# Patient Record
Sex: MALE | Race: WHITE | Employment: FULL TIME | URBAN - METROPOLITAN AREA
[De-identification: names, ages, dates, MRNs, and addresses within clinical notes are randomized per-mention and may not be internally consistent; named-entity substitution may affect disease eponyms.]

---

## 2018-01-01 ENCOUNTER — APPOINTMENT (OUTPATIENT)
Dept: GENERAL RADIOLOGY | Age: 28
DRG: 917 | End: 2018-01-01
Payer: COMMERCIAL

## 2018-01-01 ENCOUNTER — HOSPITAL ENCOUNTER (INPATIENT)
Age: 28
LOS: 4 days | Discharge: HOSPICE | DRG: 917 | End: 2018-10-12
Attending: EMERGENCY MEDICINE | Admitting: INTERNAL MEDICINE
Payer: COMMERCIAL

## 2018-01-01 ENCOUNTER — APPOINTMENT (OUTPATIENT)
Dept: CT IMAGING | Age: 28
DRG: 917 | End: 2018-01-01
Payer: COMMERCIAL

## 2018-01-01 ENCOUNTER — APPOINTMENT (OUTPATIENT)
Dept: MRI IMAGING | Age: 28
DRG: 917 | End: 2018-01-01
Payer: COMMERCIAL

## 2018-01-01 VITALS
SYSTOLIC BLOOD PRESSURE: 152 MMHG | OXYGEN SATURATION: 91 % | TEMPERATURE: 98.8 F | HEIGHT: 72 IN | HEART RATE: 71 BPM | WEIGHT: 190.04 LBS | BODY MASS INDEX: 25.74 KG/M2 | DIASTOLIC BLOOD PRESSURE: 78 MMHG | RESPIRATION RATE: 26 BRPM

## 2018-01-01 DIAGNOSIS — T40.1X1A ACCIDENTAL OVERDOSE OF HEROIN, INITIAL ENCOUNTER (HCC): Primary | ICD-10-CM

## 2018-01-01 DIAGNOSIS — R77.8 ELEVATED TROPONIN: ICD-10-CM

## 2018-01-01 DIAGNOSIS — G93.1 ANOXIC BRAIN INJURY (HCC): ICD-10-CM

## 2018-01-01 DIAGNOSIS — N17.9 AKI (ACUTE KIDNEY INJURY) (HCC): ICD-10-CM

## 2018-01-01 LAB
ACANTHOCYTES: 0
ACETAMINOPHEN LEVEL: <5 UG/ML (ref 10–30)
ALBUMIN SERPL-MCNC: 2.6 G/DL (ref 3.9–4.9)
ALBUMIN SERPL-MCNC: 2.7 G/DL (ref 3.9–4.9)
ALBUMIN SERPL-MCNC: 2.8 G/DL (ref 3.9–4.9)
ALBUMIN SERPL-MCNC: 2.9 G/DL (ref 3.9–4.9)
ALBUMIN SERPL-MCNC: 3 G/DL (ref 3.9–4.9)
ALBUMIN SERPL-MCNC: 3.2 G/DL (ref 3.9–4.9)
ALP BLD-CCNC: 121 U/L (ref 35–104)
ALP BLD-CCNC: 61 U/L (ref 35–104)
ALP BLD-CCNC: 62 U/L (ref 35–104)
ALP BLD-CCNC: 65 U/L (ref 35–104)
ALP BLD-CCNC: 68 U/L (ref 35–104)
ALP BLD-CCNC: 93 U/L (ref 35–104)
ALT SERPL-CCNC: 1389 U/L (ref 0–41)
ALT SERPL-CCNC: 1475 U/L (ref 0–41)
ALT SERPL-CCNC: 1775 U/L (ref 0–41)
ALT SERPL-CCNC: 1919 U/L (ref 0–41)
ALT SERPL-CCNC: 614 U/L (ref 0–41)
ALT SERPL-CCNC: 963 U/L (ref 0–41)
AMPHETAMINE SCREEN, URINE: ABNORMAL
ANION GAP SERPL CALCULATED.3IONS-SCNC: 12 MEQ/L (ref 7–13)
ANION GAP SERPL CALCULATED.3IONS-SCNC: 13 MEQ/L (ref 7–13)
ANION GAP SERPL CALCULATED.3IONS-SCNC: 14 MEQ/L (ref 7–13)
ANION GAP SERPL CALCULATED.3IONS-SCNC: 14 MEQ/L (ref 7–13)
ANION GAP SERPL CALCULATED.3IONS-SCNC: 15 MEQ/L (ref 7–13)
ANISOCYTOSIS: 0
APTT: 31.9 SEC (ref 21.6–35.4)
AST SERPL-CCNC: 103 U/L (ref 0–40)
AST SERPL-CCNC: 1078 U/L (ref 0–40)
AST SERPL-CCNC: 1363 U/L (ref 0–40)
AST SERPL-CCNC: 157 U/L (ref 0–40)
AST SERPL-CCNC: 347 U/L (ref 0–40)
AST SERPL-CCNC: 788 U/L (ref 0–40)
AUER RODS: 0
BANDED NEUTROPHILS RELATIVE PERCENT: 3 %
BARBITURATE SCREEN URINE: ABNORMAL
BASE EXCESS ARTERIAL: -5 (ref -3–3)
BASOPHILIC STIPPLING: 0
BASOPHILS ABSOLUTE: 0 K/UL (ref 0–0.2)
BASOPHILS RELATIVE PERCENT: 0.3 %
BENZODIAZEPINE SCREEN, URINE: ABNORMAL
BILIRUB SERPL-MCNC: 0.6 MG/DL (ref 0–1.2)
BILIRUB SERPL-MCNC: 0.8 MG/DL (ref 0–1.2)
BILIRUB SERPL-MCNC: 0.9 MG/DL (ref 0–1.2)
BILIRUB SERPL-MCNC: 1 MG/DL (ref 0–1.2)
BILIRUB SERPL-MCNC: 1.3 MG/DL (ref 0–1.2)
BILIRUB SERPL-MCNC: 1.3 MG/DL (ref 0–1.2)
BILIRUBIN DIRECT: 0.3 MG/DL (ref 0–0.3)
BILIRUBIN DIRECT: 0.3 MG/DL (ref 0–0.3)
BILIRUBIN DIRECT: 0.4 MG/DL (ref 0–0.3)
BILIRUBIN DIRECT: 0.5 MG/DL (ref 0–0.3)
BILIRUBIN DIRECT: 0.6 MG/DL (ref 0–0.3)
BILIRUBIN, INDIRECT: 0.5 MG/DL (ref 0–0.6)
BILIRUBIN, INDIRECT: 0.5 MG/DL (ref 0–0.6)
BILIRUBIN, INDIRECT: 0.7 MG/DL (ref 0–0.6)
BILIRUBIN, INDIRECT: 0.7 MG/DL (ref 0–0.6)
BILIRUBIN, INDIRECT: 0.8 MG/DL (ref 0–0.6)
BLOOD CULTURE, ROUTINE: NORMAL
BUN BLDV-MCNC: 11 MG/DL (ref 6–20)
BUN BLDV-MCNC: 13 MG/DL (ref 6–20)
BUN BLDV-MCNC: 15 MG/DL (ref 6–20)
BUN BLDV-MCNC: 15 MG/DL (ref 6–20)
BUN BLDV-MCNC: 24 MG/DL (ref 6–20)
BURR CELLS: 0
CABOT RINGS: 0
CALCIUM IONIZED, CALC AT PH 7.4: 1.16 MMOL/L (ref 1.11–1.3)
CALCIUM IONIZED, CALC AT PH 7.4: 1.25 MMOL/L (ref 1.11–1.3)
CALCIUM IONIZED, CALC AT PH 7.4: 1.3 MMOL/L (ref 1.11–1.3)
CALCIUM IONIZED: 1.03 MMOL/L (ref 1.12–1.32)
CALCIUM IONIZED: 1.17 MMOL/L (ref 1.11–1.3)
CALCIUM IONIZED: 1.21 MMOL/L (ref 1.11–1.3)
CALCIUM IONIZED: 1.25 MMOL/L (ref 1.11–1.3)
CALCIUM SERPL-MCNC: 7.2 MG/DL (ref 8.6–10.2)
CALCIUM SERPL-MCNC: 7.9 MG/DL (ref 8.6–10.2)
CALCIUM SERPL-MCNC: 8.2 MG/DL (ref 8.6–10.2)
CALCIUM SERPL-MCNC: 8.3 MG/DL (ref 8.6–10.2)
CALCIUM SERPL-MCNC: 8.4 MG/DL (ref 8.6–10.2)
CANNABINOID SCREEN URINE: ABNORMAL
CHLORIDE BLD-SCNC: 101 MEQ/L (ref 98–107)
CHLORIDE BLD-SCNC: 106 MEQ/L (ref 98–107)
CHLORIDE BLD-SCNC: 109 MEQ/L (ref 98–107)
CHLORIDE BLD-SCNC: 109 MEQ/L (ref 98–107)
CHLORIDE BLD-SCNC: 113 MEQ/L (ref 98–107)
CK MB: 16.7 NG/ML (ref 0–6.7)
CK MB: 5.4 NG/ML (ref 0–6.7)
CK MB: 64.1 NG/ML (ref 0–6.7)
CO2: 20 MEQ/L (ref 22–29)
CO2: 21 MEQ/L (ref 22–29)
CO2: 22 MEQ/L (ref 22–29)
COCAINE METABOLITE SCREEN URINE: ABNORMAL
CREAT SERPL-MCNC: 0.31 MG/DL (ref 0.7–1.2)
CREAT SERPL-MCNC: 0.53 MG/DL (ref 0.7–1.2)
CREAT SERPL-MCNC: 0.57 MG/DL (ref 0.7–1.2)
CREAT SERPL-MCNC: 0.7 MG/DL (ref 0.7–1.2)
CREAT SERPL-MCNC: 1.79 MG/DL (ref 0.7–1.2)
CREATINE KINASE-MB INDEX: 0.2 % (ref 0–3.5)
CREATINE KINASE-MB INDEX: 0.2 % (ref 0–3.5)
CREATINE KINASE-MB INDEX: 0.3 % (ref 0–3.5)
CULTURE, BLOOD 2: NORMAL
CULTURE, RESPIRATORY: NORMAL
DOHLE BODIES: 0
EKG ATRIAL RATE: 102 BPM
EKG P AXIS: 66 DEGREES
EKG P-R INTERVAL: 140 MS
EKG Q-T INTERVAL: 362 MS
EKG QRS DURATION: 88 MS
EKG QTC CALCULATION (BAZETT): 471 MS
EKG R AXIS: 73 DEGREES
EKG T AXIS: 42 DEGREES
EKG VENTRICULAR RATE: 102 BPM
EOSINOPHILS ABSOLUTE: 0 K/UL (ref 0–0.7)
EOSINOPHILS RELATIVE PERCENT: 0.1 %
ETHANOL PERCENT: NORMAL G/DL
ETHANOL: <10 MG/DL (ref 0–0.08)
GFR AFRICAN AMERICAN: 54.2
GFR AFRICAN AMERICAN: 58
GFR AFRICAN AMERICAN: >60
GFR NON-AFRICAN AMERICAN: 44.8
GFR NON-AFRICAN AMERICAN: 48
GFR NON-AFRICAN AMERICAN: >60
GLOBULIN: 1.9 G/DL (ref 2.3–3.5)
GLUCOSE BLD-MCNC: 106 MG/DL (ref 60–115)
GLUCOSE BLD-MCNC: 106 MG/DL (ref 60–115)
GLUCOSE BLD-MCNC: 113 MG/DL (ref 60–115)
GLUCOSE BLD-MCNC: 114 MG/DL (ref 74–109)
GLUCOSE BLD-MCNC: 116 MG/DL (ref 60–115)
GLUCOSE BLD-MCNC: 120 MG/DL (ref 60–115)
GLUCOSE BLD-MCNC: 121 MG/DL (ref 60–115)
GLUCOSE BLD-MCNC: 122 MG/DL (ref 60–115)
GLUCOSE BLD-MCNC: 123 MG/DL (ref 60–115)
GLUCOSE BLD-MCNC: 130 MG/DL (ref 74–109)
GLUCOSE BLD-MCNC: 130 MG/DL (ref 74–109)
GLUCOSE BLD-MCNC: 136 MG/DL (ref 60–115)
GLUCOSE BLD-MCNC: 140 MG/DL (ref 60–115)
GLUCOSE BLD-MCNC: 142 MG/DL (ref 74–109)
GLUCOSE BLD-MCNC: 144 MG/DL (ref 60–115)
GLUCOSE BLD-MCNC: 154 MG/DL (ref 74–109)
GLUCOSE BLD-MCNC: 156 MG/DL (ref 60–115)
GRAM STAIN RESULT: NORMAL
HAIRY CELLS: 0
HAV IGM SER IA-ACNC: NORMAL
HCO3 ARTERIAL: 19.7 MMOL/L (ref 21–29)
HCT VFR BLD CALC: 42.8 % (ref 42–52)
HCT VFR BLD CALC: 45 % (ref 42–52)
HCT VFR BLD CALC: 45.5 % (ref 42–52)
HCT VFR BLD CALC: 46 % (ref 42–52)
HCT VFR BLD CALC: 47.5 % (ref 42–52)
HEMOGLOBIN: 14.4 GM/DL (ref 13.5–17.5)
HEMOGLOBIN: 14.9 G/DL (ref 14–18)
HEMOGLOBIN: 15.6 G/DL (ref 14–18)
HEMOGLOBIN: 15.7 G/DL (ref 14–18)
HEMOGLOBIN: 15.8 G/DL (ref 14–18)
HEMOGLOBIN: 16.1 G/DL (ref 14–18)
HEPATITIS B CORE IGM ANTIBODY: NORMAL
HEPATITIS B SURFACE ANTIGEN INTERPRETATION: NORMAL
HEPATITIS C ANTIBODY INTERPRETATION: NORMAL
HOWELL-JOLLY BODIES: 0
HYPERSEGMENTED NEUTROPHILS: 0
HYPOCHROMIA: 0
INR BLD: 1.2
INR BLD: 1.3
LACTATE DEHYDROGENASE: 2337 U/L (ref 135–225)
LACTATE: 1.38 MMOL/L (ref 0.4–2)
LACTIC ACID: 1.7 MMOL/L (ref 0.5–2.2)
LYMPHOCYTES ABSOLUTE: 0.4 K/UL (ref 1–4.8)
LYMPHOCYTES RELATIVE PERCENT: 4 %
Lab: ABNORMAL
MACROCYTES: 0
MAGNESIUM: 2.1 MG/DL (ref 1.7–2.3)
MAGNESIUM: 2.2 MG/DL (ref 1.7–2.3)
MCH RBC QN AUTO: 31.4 PG (ref 27–31.3)
MCH RBC QN AUTO: 31.5 PG (ref 27–31.3)
MCH RBC QN AUTO: 31.8 PG (ref 27–31.3)
MCH RBC QN AUTO: 31.8 PG (ref 27–31.3)
MCH RBC QN AUTO: 31.9 PG (ref 27–31.3)
MCHC RBC AUTO-ENTMCNC: 33.9 % (ref 33–37)
MCHC RBC AUTO-ENTMCNC: 34.5 % (ref 33–37)
MCHC RBC AUTO-ENTMCNC: 34.6 % (ref 33–37)
MCHC RBC AUTO-ENTMCNC: 34.6 % (ref 33–37)
MCHC RBC AUTO-ENTMCNC: 34.9 % (ref 33–37)
MCV RBC AUTO: 90.8 FL (ref 80–100)
MCV RBC AUTO: 91.2 FL (ref 80–100)
MCV RBC AUTO: 91.9 FL (ref 80–100)
MCV RBC AUTO: 92.6 FL (ref 80–100)
MCV RBC AUTO: 92.8 FL (ref 80–100)
METAMYELOCYTES RELATIVE PERCENT: 2 %
MICROCYTES: 0
MISCELLANEOUS LAB TEST ORDER: NORMAL
MONOCYTES ABSOLUTE: 0.9 K/UL (ref 0.2–0.8)
MONOCYTES RELATIVE PERCENT: 7.6 %
NEUTROPHILS ABSOLUTE: 9.8 K/UL (ref 1.4–6.5)
NEUTROPHILS RELATIVE PERCENT: 84 %
NUCLEATED RED BLOOD CELLS: 1 /100 WBC
O2 SAT, ARTERIAL: 100 % (ref 93–100)
OPIATE SCREEN URINE: POSITIVE
OVALOCYTES: 0
PAPPENHEIMER BODIES: 0
PCO2 ARTERIAL: 33 MM HG (ref 35–45)
PDW BLD-RTO: 12.4 % (ref 11.5–14.5)
PDW BLD-RTO: 12.4 % (ref 11.5–14.5)
PDW BLD-RTO: 12.8 % (ref 11.5–14.5)
PDW BLD-RTO: 13 % (ref 11.5–14.5)
PDW BLD-RTO: 13.1 % (ref 11.5–14.5)
PELGER HUET CELLS: 0 %
PERFORMED ON: ABNORMAL
PERFORMED ON: NORMAL
PH ARTERIAL: 7.38 (ref 7.35–7.45)
PHENCYCLIDINE SCREEN URINE: ABNORMAL
PHOSPHORUS: 0.8 MG/DL (ref 2.5–4.5)
PHOSPHORUS: 1.2 MG/DL (ref 2.5–4.5)
PHOSPHORUS: 1.9 MG/DL (ref 2.5–4.5)
PHOSPHORUS: 3.2 MG/DL (ref 2.5–4.5)
PLATELET # BLD: 245 K/UL (ref 130–400)
PLATELET # BLD: 249 K/UL (ref 130–400)
PLATELET # BLD: 252 K/UL (ref 130–400)
PLATELET # BLD: 285 K/UL (ref 130–400)
PLATELET # BLD: 295 K/UL (ref 130–400)
PLATELET SLIDE REVIEW: ADEQUATE
PO2 ARTERIAL: 176 MM HG (ref 75–108)
POC CHLORIDE: 107 MEQ/L (ref 99–110)
POC CREATININE: 1.7 MG/DL (ref 0.9–1.3)
POC FIO2: 100
POC HEMATOCRIT: 42 % (ref 41–53)
POC POTASSIUM: 4.6 MEQ/L (ref 3.5–5.1)
POC SAMPLE TYPE: ABNORMAL
POC SODIUM: 137 MEQ/L (ref 136–145)
POIKILOCYTES: 0
POLYCHROMASIA: 0
POTASSIUM REFLEX MAGNESIUM: 3.5 MEQ/L (ref 3.5–5.1)
POTASSIUM REFLEX MAGNESIUM: 3.6 MEQ/L (ref 3.5–5.1)
POTASSIUM REFLEX MAGNESIUM: 4 MEQ/L (ref 3.5–5.1)
POTASSIUM REFLEX MAGNESIUM: 4.2 MEQ/L (ref 3.5–5.1)
POTASSIUM SERPL-SCNC: 3.5 MEQ/L (ref 3.5–5.1)
POTASSIUM SERPL-SCNC: 3.6 MEQ/L (ref 3.5–5.1)
POTASSIUM SERPL-SCNC: 4 MEQ/L (ref 3.5–5.1)
POTASSIUM SERPL-SCNC: 4.2 MEQ/L (ref 3.5–5.1)
POTASSIUM SERPL-SCNC: 4.6 MEQ/L (ref 3.5–5.1)
PROCALCITONIN: 0.74 NG/ML (ref 0–0.15)
PROTHROMBIN TIME: 13 SEC (ref 9.6–12.3)
PROTHROMBIN TIME: 13.4 SEC (ref 9.6–12.3)
RBC # BLD: 4.69 M/UL (ref 4.7–6.1)
RBC # BLD: 4.95 M/UL (ref 4.7–6.1)
RBC # BLD: 4.96 M/UL (ref 4.7–6.1)
RBC # BLD: 4.97 M/UL (ref 4.7–6.1)
RBC # BLD: 5.12 M/UL (ref 4.7–6.1)
RBC # BLD: NORMAL 10*6/UL
SCHISTOCYTES: 0
SICKLE CELLS: 0
SMUDGE CELLS: 1
SODIUM BLD-SCNC: 136 MEQ/L (ref 132–144)
SODIUM BLD-SCNC: 142 MEQ/L (ref 132–144)
SODIUM BLD-SCNC: 143 MEQ/L (ref 132–144)
SODIUM BLD-SCNC: 144 MEQ/L (ref 132–144)
SODIUM BLD-SCNC: 148 MEQ/L (ref 132–144)
SPHEROCYTES: 0
STOMATOCYTES: 0
TARGET CELLS: 0
TCO2 ARTERIAL: 21 (ref 22–29)
TEAR DROP CELLS: 0
TOTAL CK: 2406 U/L (ref 0–190)
TOTAL CK: 7058 U/L (ref 0–190)
TOTAL CK: ABNORMAL U/L (ref 0–190)
TOTAL PROTEIN: 5.1 G/DL (ref 6.4–8.1)
TOTAL PROTEIN: 5.2 G/DL (ref 6.4–8.1)
TOTAL PROTEIN: 5.2 G/DL (ref 6.4–8.1)
TOTAL PROTEIN: 5.4 G/DL (ref 6.4–8.1)
TOTAL PROTEIN: 5.5 G/DL (ref 6.4–8.1)
TOTAL PROTEIN: 5.5 G/DL (ref 6.4–8.1)
TOXIC GRANULATION: 0
TROPONIN: 0.05 NG/ML (ref 0–0.01)
TROPONIN: 0.05 NG/ML (ref 0–0.01)
TROPONIN: 0.06 NG/ML (ref 0–0.01)
VACUOLATED NEUTROPHILS: 0
WBC # BLD: 11 K/UL (ref 4.8–10.8)
WBC # BLD: 13.4 K/UL (ref 4.8–10.8)
WBC # BLD: 15.1 K/UL (ref 4.8–10.8)
WBC # BLD: 15.3 K/UL (ref 4.8–10.8)
WBC # BLD: 16.4 K/UL (ref 4.8–10.8)
WHOPPER PROMPT: NORMAL

## 2018-01-01 PROCEDURE — 87070 CULTURE OTHR SPECIMN AEROBIC: CPT

## 2018-01-01 PROCEDURE — 94003 VENT MGMT INPAT SUBQ DAY: CPT

## 2018-01-01 PROCEDURE — 6360000002 HC RX W HCPCS: Performed by: NURSE PRACTITIONER

## 2018-01-01 PROCEDURE — 6370000000 HC RX 637 (ALT 250 FOR IP): Performed by: INTERNAL MEDICINE

## 2018-01-01 PROCEDURE — 2500000003 HC RX 250 WO HCPCS: Performed by: EMERGENCY MEDICINE

## 2018-01-01 PROCEDURE — 94002 VENT MGMT INPAT INIT DAY: CPT

## 2018-01-01 PROCEDURE — 71045 X-RAY EXAM CHEST 1 VIEW: CPT

## 2018-01-01 PROCEDURE — 82330 ASSAY OF CALCIUM: CPT

## 2018-01-01 PROCEDURE — 70553 MRI BRAIN STEM W/O & W/DYE: CPT

## 2018-01-01 PROCEDURE — 80076 HEPATIC FUNCTION PANEL: CPT

## 2018-01-01 PROCEDURE — 80048 BASIC METABOLIC PNL TOTAL CA: CPT

## 2018-01-01 PROCEDURE — 2700000000 HC OXYGEN THERAPY PER DAY

## 2018-01-01 PROCEDURE — 85025 COMPLETE CBC W/AUTO DIFF WBC: CPT

## 2018-01-01 PROCEDURE — 36415 COLL VENOUS BLD VENIPUNCTURE: CPT

## 2018-01-01 PROCEDURE — 2580000003 HC RX 258: Performed by: INTERNAL MEDICINE

## 2018-01-01 PROCEDURE — 70450 CT HEAD/BRAIN W/O DYE: CPT

## 2018-01-01 PROCEDURE — 87040 BLOOD CULTURE FOR BACTERIA: CPT

## 2018-01-01 PROCEDURE — 2580000003 HC RX 258: Performed by: PSYCHIATRY & NEUROLOGY

## 2018-01-01 PROCEDURE — 83605 ASSAY OF LACTIC ACID: CPT

## 2018-01-01 PROCEDURE — 83735 ASSAY OF MAGNESIUM: CPT

## 2018-01-01 PROCEDURE — 99291 CRITICAL CARE FIRST HOUR: CPT | Performed by: INTERNAL MEDICINE

## 2018-01-01 PROCEDURE — 85027 COMPLETE CBC AUTOMATED: CPT

## 2018-01-01 PROCEDURE — 80053 COMPREHEN METABOLIC PANEL: CPT

## 2018-01-01 PROCEDURE — 6360000002 HC RX W HCPCS: Performed by: INTERNAL MEDICINE

## 2018-01-01 PROCEDURE — 94640 AIRWAY INHALATION TREATMENT: CPT

## 2018-01-01 PROCEDURE — 82565 ASSAY OF CREATININE: CPT

## 2018-01-01 PROCEDURE — 2580000003 HC RX 258: Performed by: NURSE PRACTITIONER

## 2018-01-01 PROCEDURE — 82550 ASSAY OF CK (CPK): CPT

## 2018-01-01 PROCEDURE — 2580000003 HC RX 258

## 2018-01-01 PROCEDURE — 95816 EEG AWAKE AND DROWSY: CPT

## 2018-01-01 PROCEDURE — 84132 ASSAY OF SERUM POTASSIUM: CPT

## 2018-01-01 PROCEDURE — 5A1955Z RESPIRATORY VENTILATION, GREATER THAN 96 CONSECUTIVE HOURS: ICD-10-PCS | Performed by: EMERGENCY MEDICINE

## 2018-01-01 PROCEDURE — 2000000000 HC ICU R&B

## 2018-01-01 PROCEDURE — 82553 CREATINE MB FRACTION: CPT

## 2018-01-01 PROCEDURE — 99285 EMERGENCY DEPT VISIT HI MDM: CPT

## 2018-01-01 PROCEDURE — 84100 ASSAY OF PHOSPHORUS: CPT

## 2018-01-01 PROCEDURE — 6360000004 HC RX CONTRAST MEDICATION: Performed by: PSYCHIATRY & NEUROLOGY

## 2018-01-01 PROCEDURE — A9579 GAD-BASE MR CONTRAST NOS,1ML: HCPCS | Performed by: PSYCHIATRY & NEUROLOGY

## 2018-01-01 PROCEDURE — 85610 PROTHROMBIN TIME: CPT

## 2018-01-01 PROCEDURE — 96375 TX/PRO/DX INJ NEW DRUG ADDON: CPT

## 2018-01-01 PROCEDURE — 2580000003 HC RX 258: Performed by: EMERGENCY MEDICINE

## 2018-01-01 PROCEDURE — 93010 ELECTROCARDIOGRAM REPORT: CPT | Performed by: INTERNAL MEDICINE

## 2018-01-01 PROCEDURE — 80074 ACUTE HEPATITIS PANEL: CPT

## 2018-01-01 PROCEDURE — 82948 REAGENT STRIP/BLOOD GLUCOSE: CPT

## 2018-01-01 PROCEDURE — 2500000003 HC RX 250 WO HCPCS

## 2018-01-01 PROCEDURE — 93005 ELECTROCARDIOGRAM TRACING: CPT

## 2018-01-01 PROCEDURE — 80307 DRUG TEST PRSMV CHEM ANLYZR: CPT

## 2018-01-01 PROCEDURE — 31500 INSERT EMERGENCY AIRWAY: CPT

## 2018-01-01 PROCEDURE — 6360000002 HC RX W HCPCS: Performed by: EMERGENCY MEDICINE

## 2018-01-01 PROCEDURE — 85730 THROMBOPLASTIN TIME PARTIAL: CPT

## 2018-01-01 PROCEDURE — 84295 ASSAY OF SERUM SODIUM: CPT

## 2018-01-01 PROCEDURE — 85014 HEMATOCRIT: CPT

## 2018-01-01 PROCEDURE — 84484 ASSAY OF TROPONIN QUANT: CPT

## 2018-01-01 PROCEDURE — 2500000003 HC RX 250 WO HCPCS: Performed by: INTERNAL MEDICINE

## 2018-01-01 PROCEDURE — 0BH17EZ INSERTION OF ENDOTRACHEAL AIRWAY INTO TRACHEA, VIA NATURAL OR ARTIFICIAL OPENING: ICD-10-PCS | Performed by: EMERGENCY MEDICINE

## 2018-01-01 PROCEDURE — 96374 THER/PROPH/DIAG INJ IV PUSH: CPT

## 2018-01-01 PROCEDURE — 87205 SMEAR GRAM STAIN: CPT

## 2018-01-01 PROCEDURE — 82435 ASSAY OF BLOOD CHLORIDE: CPT

## 2018-01-01 PROCEDURE — G0480 DRUG TEST DEF 1-7 CLASSES: HCPCS

## 2018-01-01 PROCEDURE — 6360000002 HC RX W HCPCS

## 2018-01-01 PROCEDURE — 89220 SPUTUM SPECIMEN COLLECTION: CPT

## 2018-01-01 PROCEDURE — 84145 PROCALCITONIN (PCT): CPT

## 2018-01-01 PROCEDURE — 82803 BLOOD GASES ANY COMBINATION: CPT

## 2018-01-01 PROCEDURE — 83615 LACTATE (LD) (LDH) ENZYME: CPT

## 2018-01-01 RX ORDER — MORPHINE SULFATE 4 MG/ML
4 INJECTION, SOLUTION INTRAMUSCULAR; INTRAVENOUS ONCE
Status: COMPLETED | OUTPATIENT
Start: 2018-01-01 | End: 2018-01-01

## 2018-01-01 RX ORDER — MAGNESIUM SULFATE 1 G/100ML
1 INJECTION INTRAVENOUS PRN
Status: DISCONTINUED | OUTPATIENT
Start: 2018-01-01 | End: 2018-01-01

## 2018-01-01 RX ORDER — MORPHINE SULFATE 2 MG/ML
2 INJECTION, SOLUTION INTRAMUSCULAR; INTRAVENOUS EVERY 30 MIN PRN
Status: CANCELLED | OUTPATIENT
Start: 2018-01-01

## 2018-01-01 RX ORDER — LORAZEPAM 2 MG/ML
INJECTION INTRAMUSCULAR
Status: COMPLETED
Start: 2018-01-01 | End: 2018-01-01

## 2018-01-01 RX ORDER — MORPHINE SULFATE 4 MG/ML
INJECTION, SOLUTION INTRAMUSCULAR; INTRAVENOUS
Status: COMPLETED
Start: 2018-01-01 | End: 2018-01-01

## 2018-01-01 RX ORDER — PROPOFOL 10 MG/ML
10 INJECTION, EMULSION INTRAVENOUS ONCE
Status: COMPLETED | OUTPATIENT
Start: 2018-01-01 | End: 2018-01-01

## 2018-01-01 RX ORDER — FUROSEMIDE 20 MG/1
20 TABLET ORAL DAILY
Status: DISCONTINUED | OUTPATIENT
Start: 2018-01-01 | End: 2018-01-01

## 2018-01-01 RX ORDER — ALBUTEROL SULFATE 90 UG/1
4 AEROSOL, METERED RESPIRATORY (INHALATION) 4 TIMES DAILY
Status: DISCONTINUED | OUTPATIENT
Start: 2018-01-01 | End: 2018-01-01

## 2018-01-01 RX ORDER — 0.9 % SODIUM CHLORIDE 0.9 %
1000 INTRAVENOUS SOLUTION INTRAVENOUS ONCE
Status: COMPLETED | OUTPATIENT
Start: 2018-01-01 | End: 2018-01-01

## 2018-01-01 RX ORDER — ETOMIDATE 2 MG/ML
INJECTION INTRAVENOUS
Status: COMPLETED
Start: 2018-01-01 | End: 2018-01-01

## 2018-01-01 RX ORDER — SODIUM CHLORIDE 9 MG/ML
INJECTION, SOLUTION INTRAVENOUS
Status: COMPLETED
Start: 2018-01-01 | End: 2018-01-01

## 2018-01-01 RX ORDER — ACETAMINOPHEN 160 MG/5ML
650 SOLUTION ORAL EVERY 4 HOURS PRN
Status: DISCONTINUED | OUTPATIENT
Start: 2018-01-01 | End: 2018-01-01

## 2018-01-01 RX ORDER — LABETALOL HYDROCHLORIDE 5 MG/ML
10 INJECTION, SOLUTION INTRAVENOUS EVERY 6 HOURS PRN
Status: DISCONTINUED | OUTPATIENT
Start: 2018-01-01 | End: 2018-01-01

## 2018-01-01 RX ORDER — IPRATROPIUM BROMIDE AND ALBUTEROL SULFATE 2.5; .5 MG/3ML; MG/3ML
1 SOLUTION RESPIRATORY (INHALATION) 4 TIMES DAILY
Status: DISCONTINUED | OUTPATIENT
Start: 2018-01-01 | End: 2018-01-01

## 2018-01-01 RX ORDER — SODIUM CHLORIDE 9 MG/ML
INJECTION, SOLUTION INTRAVENOUS CONTINUOUS
Status: DISCONTINUED | OUTPATIENT
Start: 2018-01-01 | End: 2018-01-01 | Stop reason: ALTCHOICE

## 2018-01-01 RX ORDER — SODIUM CHLORIDE 0.9 % (FLUSH) 0.9 %
10 SYRINGE (ML) INJECTION 2 TIMES DAILY
Status: DISCONTINUED | OUTPATIENT
Start: 2018-01-01 | End: 2018-01-01

## 2018-01-01 RX ORDER — LORAZEPAM 2 MG/ML
1 INJECTION INTRAMUSCULAR
Status: CANCELLED | OUTPATIENT
Start: 2018-01-01

## 2018-01-01 RX ORDER — DEXTROSE MONOHYDRATE 50 MG/ML
100 INJECTION, SOLUTION INTRAVENOUS PRN
Status: DISCONTINUED | OUTPATIENT
Start: 2018-01-01 | End: 2018-01-01

## 2018-01-01 RX ORDER — ROCURONIUM BROMIDE 10 MG/ML
100 INJECTION, SOLUTION INTRAVENOUS ONCE
Status: COMPLETED | OUTPATIENT
Start: 2018-01-01 | End: 2018-01-01

## 2018-01-01 RX ORDER — ALBUTEROL SULFATE 2.5 MG/3ML
2.5 SOLUTION RESPIRATORY (INHALATION) EVERY 4 HOURS
Status: DISCONTINUED | OUTPATIENT
Start: 2018-01-01 | End: 2018-01-01

## 2018-01-01 RX ORDER — POTASSIUM CHLORIDE 7.45 MG/ML
10 INJECTION INTRAVENOUS PRN
Status: DISCONTINUED | OUTPATIENT
Start: 2018-01-01 | End: 2018-01-01

## 2018-01-01 RX ORDER — HYDRALAZINE HYDROCHLORIDE 20 MG/ML
10 INJECTION INTRAMUSCULAR; INTRAVENOUS EVERY 6 HOURS PRN
Status: DISCONTINUED | OUTPATIENT
Start: 2018-01-01 | End: 2018-01-01

## 2018-01-01 RX ORDER — SODIUM CHLORIDE 0.9 % (FLUSH) 0.9 %
10 SYRINGE (ML) INJECTION EVERY 12 HOURS SCHEDULED
Status: DISCONTINUED | OUTPATIENT
Start: 2018-01-01 | End: 2018-01-01

## 2018-01-01 RX ORDER — NALOXONE HYDROCHLORIDE 1 MG/ML
4 INJECTION INTRAMUSCULAR; INTRAVENOUS; SUBCUTANEOUS ONCE
Status: COMPLETED | OUTPATIENT
Start: 2018-01-01 | End: 2018-01-01

## 2018-01-01 RX ORDER — MIDAZOLAM HYDROCHLORIDE 1 MG/ML
4 INJECTION INTRAMUSCULAR; INTRAVENOUS ONCE
Status: COMPLETED | OUTPATIENT
Start: 2018-01-01 | End: 2018-01-01

## 2018-01-01 RX ORDER — SODIUM CHLORIDE 0.9 % (FLUSH) 0.9 %
10 SYRINGE (ML) INJECTION PRN
Status: DISCONTINUED | OUTPATIENT
Start: 2018-01-01 | End: 2018-01-01

## 2018-01-01 RX ORDER — ONDANSETRON 2 MG/ML
4 INJECTION INTRAMUSCULAR; INTRAVENOUS EVERY 6 HOURS PRN
Status: DISCONTINUED | OUTPATIENT
Start: 2018-01-01 | End: 2018-01-01

## 2018-01-01 RX ORDER — NICOTINE POLACRILEX 4 MG
15 LOZENGE BUCCAL PRN
Status: DISCONTINUED | OUTPATIENT
Start: 2018-01-01 | End: 2018-01-01

## 2018-01-01 RX ORDER — DEXTROSE MONOHYDRATE 50 MG/ML
INJECTION, SOLUTION INTRAVENOUS CONTINUOUS
Status: DISCONTINUED | OUTPATIENT
Start: 2018-01-01 | End: 2018-01-01

## 2018-01-01 RX ORDER — SODIUM CHLORIDE 450 MG/100ML
INJECTION, SOLUTION INTRAVENOUS CONTINUOUS
Status: DISCONTINUED | OUTPATIENT
Start: 2018-01-01 | End: 2018-01-01

## 2018-01-01 RX ORDER — DEXTROSE MONOHYDRATE 25 G/50ML
12.5 INJECTION, SOLUTION INTRAVENOUS PRN
Status: DISCONTINUED | OUTPATIENT
Start: 2018-01-01 | End: 2018-01-01

## 2018-01-01 RX ORDER — MORPHINE SULFATE 2 MG/ML
2 INJECTION, SOLUTION INTRAMUSCULAR; INTRAVENOUS EVERY 30 MIN PRN
Status: DISCONTINUED | OUTPATIENT
Start: 2018-01-01 | End: 2018-01-01 | Stop reason: HOSPADM

## 2018-01-01 RX ORDER — LORAZEPAM 2 MG/ML
1 INJECTION INTRAMUSCULAR
Status: DISCONTINUED | OUTPATIENT
Start: 2018-01-01 | End: 2018-01-01 | Stop reason: HOSPADM

## 2018-01-01 RX ORDER — FAMOTIDINE 20 MG/1
20 TABLET, FILM COATED ORAL 2 TIMES DAILY
Status: DISCONTINUED | OUTPATIENT
Start: 2018-01-01 | End: 2018-01-01

## 2018-01-01 RX ORDER — POLYETHYLENE GLYCOL 3350 17 G/17G
17 POWDER, FOR SOLUTION ORAL DAILY
Status: DISCONTINUED | OUTPATIENT
Start: 2018-01-01 | End: 2018-01-01

## 2018-01-01 RX ORDER — PROPOFOL 10 MG/ML
10 INJECTION, EMULSION INTRAVENOUS
Status: DISCONTINUED | OUTPATIENT
Start: 2018-01-01 | End: 2018-01-01

## 2018-01-01 RX ADMIN — Medication 10 ML: at 08:31

## 2018-01-01 RX ADMIN — LORAZEPAM 1 MG: 2 INJECTION INTRAMUSCULAR; INTRAVENOUS at 14:45

## 2018-01-01 RX ADMIN — SODIUM CHLORIDE: 9 INJECTION, SOLUTION INTRAVENOUS at 03:23

## 2018-01-01 RX ADMIN — LORAZEPAM 1 MG: 2 INJECTION INTRAMUSCULAR; INTRAVENOUS at 15:12

## 2018-01-01 RX ADMIN — FAMOTIDINE 20 MG: 20 TABLET ORAL at 21:05

## 2018-01-01 RX ADMIN — Medication 10 ML: at 10:43

## 2018-01-01 RX ADMIN — ETOMIDATE 20 MG: 2 INJECTION INTRAVENOUS at 07:28

## 2018-01-01 RX ADMIN — LORAZEPAM 1 MG: 2 INJECTION INTRAMUSCULAR; INTRAVENOUS at 21:34

## 2018-01-01 RX ADMIN — PROPOFOL 5 MCG/KG/MIN: 10 INJECTION, EMULSION INTRAVENOUS at 08:35

## 2018-01-01 RX ADMIN — DOCUSATE SODIUM 100 MG: 50 LIQUID ORAL at 10:18

## 2018-01-01 RX ADMIN — FAMOTIDINE 20 MG: 20 TABLET ORAL at 08:30

## 2018-01-01 RX ADMIN — ENOXAPARIN SODIUM 40 MG: 40 INJECTION SUBCUTANEOUS at 10:18

## 2018-01-01 RX ADMIN — Medication 10 ML: at 10:21

## 2018-01-01 RX ADMIN — MEROPENEM 1 G: 1 INJECTION, POWDER, FOR SOLUTION INTRAVENOUS at 03:04

## 2018-01-01 RX ADMIN — DOCUSATE SODIUM 100 MG: 50 LIQUID ORAL at 21:04

## 2018-01-01 RX ADMIN — FAMOTIDINE 20 MG: 20 TABLET ORAL at 10:19

## 2018-01-01 RX ADMIN — SODIUM CHLORIDE: 9 INJECTION, SOLUTION INTRAVENOUS at 20:43

## 2018-01-01 RX ADMIN — MEROPENEM 1 G: 1 INJECTION, POWDER, FOR SOLUTION INTRAVENOUS at 18:12

## 2018-01-01 RX ADMIN — IPRATROPIUM BROMIDE 4 PUFF: 17 AEROSOL, METERED RESPIRATORY (INHALATION) at 20:15

## 2018-01-01 RX ADMIN — IPRATROPIUM BROMIDE 4 PUFF: 17 AEROSOL, METERED RESPIRATORY (INHALATION) at 16:25

## 2018-01-01 RX ADMIN — DOCUSATE SODIUM 100 MG: 50 LIQUID ORAL at 21:06

## 2018-01-01 RX ADMIN — IPRATROPIUM BROMIDE 4 PUFF: 17 AEROSOL, METERED RESPIRATORY (INHALATION) at 16:27

## 2018-01-01 RX ADMIN — ACETAMINOPHEN 650 MG: 325 SOLUTION ORAL at 21:06

## 2018-01-01 RX ADMIN — Medication 4 PUFF: at 08:51

## 2018-01-01 RX ADMIN — SODIUM CHLORIDE 1000 ML: 9 INJECTION, SOLUTION INTRAVENOUS at 07:43

## 2018-01-01 RX ADMIN — HYPROMELLOSE 2910 1 DROP: 5 SOLUTION OPHTHALMIC at 20:05

## 2018-01-01 RX ADMIN — FUROSEMIDE 20 MG: 20 TABLET ORAL at 10:23

## 2018-01-01 RX ADMIN — Medication 4 PUFF: at 20:14

## 2018-01-01 RX ADMIN — POLYETHYLENE GLYCOL 3350 17 G: 17 POWDER, FOR SOLUTION ORAL at 07:57

## 2018-01-01 RX ADMIN — IPRATROPIUM BROMIDE 4 PUFF: 17 AEROSOL, METERED RESPIRATORY (INHALATION) at 20:56

## 2018-01-01 RX ADMIN — INSULIN LISPRO 1 UNITS: 100 INJECTION, SOLUTION INTRAVENOUS; SUBCUTANEOUS at 21:13

## 2018-01-01 RX ADMIN — IPRATROPIUM BROMIDE 4 PUFF: 17 AEROSOL, METERED RESPIRATORY (INHALATION) at 20:41

## 2018-01-01 RX ADMIN — MORPHINE SULFATE 2 MG: 2 INJECTION, SOLUTION INTRAMUSCULAR; INTRAVENOUS at 15:42

## 2018-01-01 RX ADMIN — ENOXAPARIN SODIUM 40 MG: 40 INJECTION SUBCUTANEOUS at 09:30

## 2018-01-01 RX ADMIN — IPRATROPIUM BROMIDE 4 PUFF: 17 AEROSOL, METERED RESPIRATORY (INHALATION) at 08:51

## 2018-01-01 RX ADMIN — Medication 4 PUFF: at 04:45

## 2018-01-01 RX ADMIN — LORAZEPAM 1 MG: 2 INJECTION INTRAMUSCULAR; INTRAVENOUS at 15:42

## 2018-01-01 RX ADMIN — ROCURONIUM BROMIDE 100 MG: 10 INJECTION INTRAVENOUS at 07:28

## 2018-01-01 RX ADMIN — Medication 4 PUFF: at 14:33

## 2018-01-01 RX ADMIN — MORPHINE SULFATE 4 MG: 4 INJECTION, SOLUTION INTRAMUSCULAR; INTRAVENOUS at 09:22

## 2018-01-01 RX ADMIN — MORPHINE SULFATE 4 MG: 4 INJECTION, SOLUTION INTRAMUSCULAR; INTRAVENOUS at 14:36

## 2018-01-01 RX ADMIN — MORPHINE SULFATE 2 MG: 2 INJECTION, SOLUTION INTRAMUSCULAR; INTRAVENOUS at 21:40

## 2018-01-01 RX ADMIN — MEROPENEM 1 G: 1 INJECTION, POWDER, FOR SOLUTION INTRAVENOUS at 11:39

## 2018-01-01 RX ADMIN — FAMOTIDINE 20 MG: 20 TABLET ORAL at 20:53

## 2018-01-01 RX ADMIN — LORAZEPAM 1 MG: 2 INJECTION INTRAMUSCULAR; INTRAVENOUS at 16:39

## 2018-01-01 RX ADMIN — IPRATROPIUM BROMIDE 4 PUFF: 17 AEROSOL, METERED RESPIRATORY (INHALATION) at 05:01

## 2018-01-01 RX ADMIN — SODIUM CHLORIDE: 9 INJECTION, SOLUTION INTRAVENOUS at 12:12

## 2018-01-01 RX ADMIN — Medication 10 ML: at 22:35

## 2018-01-01 RX ADMIN — Medication 4 PUFF: at 10:12

## 2018-01-01 RX ADMIN — ACETAMINOPHEN 650 MG: 325 SOLUTION ORAL at 20:42

## 2018-01-01 RX ADMIN — HYPROMELLOSE 2910 1 DROP: 5 SOLUTION OPHTHALMIC at 17:21

## 2018-01-01 RX ADMIN — Medication 4 PUFF: at 16:24

## 2018-01-01 RX ADMIN — DOCUSATE SODIUM 100 MG: 50 LIQUID ORAL at 20:42

## 2018-01-01 RX ADMIN — Medication 4 PUFF: at 20:56

## 2018-01-01 RX ADMIN — Medication 4 PUFF: at 15:49

## 2018-01-01 RX ADMIN — GADOTERIDOL 15 ML: 279.3 INJECTION, SOLUTION INTRAVENOUS at 22:52

## 2018-01-01 RX ADMIN — FAMOTIDINE 20 MG: 20 TABLET ORAL at 21:06

## 2018-01-01 RX ADMIN — SODIUM CHLORIDE: 9 INJECTION, SOLUTION INTRAVENOUS at 19:12

## 2018-01-01 RX ADMIN — PROPOFOL 10 MCG/KG/MIN: 10 INJECTION, EMULSION INTRAVENOUS at 19:12

## 2018-01-01 RX ADMIN — MEROPENEM 1 G: 1 INJECTION, POWDER, FOR SOLUTION INTRAVENOUS at 12:32

## 2018-01-01 RX ADMIN — NALOXONE HYDROCHLORIDE 4 MG: 1 INJECTION PARENTERAL at 07:24

## 2018-01-01 RX ADMIN — IPRATROPIUM BROMIDE 4 PUFF: 17 AEROSOL, METERED RESPIRATORY (INHALATION) at 04:45

## 2018-01-01 RX ADMIN — LORAZEPAM 1 MG: 2 INJECTION INTRAMUSCULAR; INTRAVENOUS at 17:09

## 2018-01-01 RX ADMIN — Medication 10 ML: at 08:32

## 2018-01-01 RX ADMIN — IPRATROPIUM BROMIDE 4 PUFF: 17 AEROSOL, METERED RESPIRATORY (INHALATION) at 05:18

## 2018-01-01 RX ADMIN — IPRATROPIUM BROMIDE 4 PUFF: 17 AEROSOL, METERED RESPIRATORY (INHALATION) at 21:36

## 2018-01-01 RX ADMIN — IPRATROPIUM BROMIDE 4 PUFF: 17 AEROSOL, METERED RESPIRATORY (INHALATION) at 04:43

## 2018-01-01 RX ADMIN — IPRATROPIUM BROMIDE 4 PUFF: 17 AEROSOL, METERED RESPIRATORY (INHALATION) at 14:33

## 2018-01-01 RX ADMIN — MEROPENEM 1 G: 1 INJECTION, POWDER, FOR SOLUTION INTRAVENOUS at 10:48

## 2018-01-01 RX ADMIN — Medication 10 ML: at 21:07

## 2018-01-01 RX ADMIN — SODIUM CHLORIDE: 9 INJECTION, SOLUTION INTRAVENOUS at 11:06

## 2018-01-01 RX ADMIN — Medication 4 PUFF: at 21:36

## 2018-01-01 RX ADMIN — Medication 10 ML: at 09:02

## 2018-01-01 RX ADMIN — DOCUSATE SODIUM 100 MG: 50 LIQUID ORAL at 08:30

## 2018-01-01 RX ADMIN — ENOXAPARIN SODIUM 40 MG: 40 INJECTION SUBCUTANEOUS at 07:57

## 2018-01-01 RX ADMIN — ENOXAPARIN SODIUM 40 MG: 40 INJECTION SUBCUTANEOUS at 11:05

## 2018-01-01 RX ADMIN — MORPHINE SULFATE 2 MG: 2 INJECTION, SOLUTION INTRAMUSCULAR; INTRAVENOUS at 17:09

## 2018-01-01 RX ADMIN — Medication 10 ML: at 11:05

## 2018-01-01 RX ADMIN — DEXTROSE MONOHYDRATE: 50 INJECTION, SOLUTION INTRAVENOUS at 11:23

## 2018-01-01 RX ADMIN — Medication 4 PUFF: at 10:26

## 2018-01-01 RX ADMIN — POLYETHYLENE GLYCOL 3350 17 G: 17 POWDER, FOR SOLUTION ORAL at 12:32

## 2018-01-01 RX ADMIN — FUROSEMIDE 20 MG: 20 TABLET ORAL at 08:30

## 2018-01-01 RX ADMIN — FAMOTIDINE 20 MG: 20 TABLET ORAL at 09:31

## 2018-01-01 RX ADMIN — MEROPENEM 1 G: 1 INJECTION, POWDER, FOR SOLUTION INTRAVENOUS at 18:35

## 2018-01-01 RX ADMIN — MIDAZOLAM HYDROCHLORIDE 4 MG: 1 INJECTION, SOLUTION INTRAMUSCULAR; INTRAVENOUS at 21:40

## 2018-01-01 RX ADMIN — HYDRALAZINE HYDROCHLORIDE 10 MG: 20 INJECTION INTRAMUSCULAR; INTRAVENOUS at 10:18

## 2018-01-01 RX ADMIN — ACETAMINOPHEN 650 MG: 325 SOLUTION ORAL at 13:08

## 2018-01-01 RX ADMIN — MEROPENEM 1 G: 1 INJECTION, POWDER, FOR SOLUTION INTRAVENOUS at 03:23

## 2018-01-01 RX ADMIN — Medication 10 ML: at 20:56

## 2018-01-01 RX ADMIN — ENOXAPARIN SODIUM 40 MG: 40 INJECTION SUBCUTANEOUS at 08:31

## 2018-01-01 RX ADMIN — MEROPENEM 1 G: 1 INJECTION, POWDER, FOR SOLUTION INTRAVENOUS at 18:24

## 2018-01-01 RX ADMIN — DOCUSATE SODIUM 100 MG: 50 LIQUID ORAL at 07:57

## 2018-01-01 RX ADMIN — MORPHINE SULFATE 2 MG: 4 INJECTION, SOLUTION INTRAMUSCULAR; INTRAVENOUS at 14:49

## 2018-01-01 RX ADMIN — FAMOTIDINE 20 MG: 20 TABLET ORAL at 21:39

## 2018-01-01 RX ADMIN — POTASSIUM PHOSPHATE, MONOBASIC AND POTASSIUM PHOSPHATE, DIBASIC 30 MMOL: 224; 236 INJECTION, SOLUTION INTRAVENOUS at 13:43

## 2018-01-01 RX ADMIN — Medication 4 PUFF: at 05:18

## 2018-01-01 RX ADMIN — DOCUSATE SODIUM 100 MG: 50 LIQUID ORAL at 10:48

## 2018-01-01 RX ADMIN — PROPOFOL 10 MCG/KG/MIN: 10 INJECTION, EMULSION INTRAVENOUS at 04:08

## 2018-01-01 RX ADMIN — Medication 10 ML: at 21:40

## 2018-01-01 RX ADMIN — HYPROMELLOSE 2910 1 DROP: 5 SOLUTION OPHTHALMIC at 20:48

## 2018-01-01 RX ADMIN — IPRATROPIUM BROMIDE 4 PUFF: 17 AEROSOL, METERED RESPIRATORY (INHALATION) at 10:26

## 2018-01-01 RX ADMIN — SODIUM CHLORIDE: 9 INJECTION, SOLUTION INTRAVENOUS at 04:07

## 2018-01-01 RX ADMIN — Medication 4 PUFF: at 05:01

## 2018-01-01 RX ADMIN — Medication 4 PUFF: at 04:43

## 2018-01-01 RX ADMIN — IPRATROPIUM BROMIDE 4 PUFF: 17 AEROSOL, METERED RESPIRATORY (INHALATION) at 10:12

## 2018-01-01 RX ADMIN — HYDRALAZINE HYDROCHLORIDE 10 MG: 20 INJECTION INTRAMUSCULAR; INTRAVENOUS at 21:05

## 2018-01-01 RX ADMIN — SODIUM CHLORIDE 1000 ML: 9 INJECTION, SOLUTION INTRAVENOUS at 09:43

## 2018-01-01 RX ADMIN — Medication 4 PUFF: at 20:41

## 2018-01-01 RX ADMIN — Medication 4 PUFF: at 16:27

## 2018-01-01 RX ADMIN — MEROPENEM 1 G: 1 INJECTION, POWDER, FOR SOLUTION INTRAVENOUS at 03:43

## 2018-01-01 RX ADMIN — FAMOTIDINE 20 MG: 20 TABLET ORAL at 07:58

## 2018-01-01 RX ADMIN — SODIUM CHLORIDE 1000 ML: 9 INJECTION, SOLUTION INTRAVENOUS at 07:50

## 2018-01-01 RX ADMIN — LORAZEPAM 1 MG: 2 INJECTION INTRAMUSCULAR; INTRAVENOUS at 20:20

## 2018-01-01 RX ADMIN — IPRATROPIUM BROMIDE 4 PUFF: 17 AEROSOL, METERED RESPIRATORY (INHALATION) at 15:49

## 2018-01-01 RX ADMIN — Medication 10 ML: at 20:57

## 2018-01-01 RX ADMIN — MORPHINE SULFATE 2 MG: 2 INJECTION, SOLUTION INTRAMUSCULAR; INTRAVENOUS at 20:32

## 2018-01-01 RX ADMIN — ACETAMINOPHEN 650 MG: 325 SOLUTION ORAL at 08:21

## 2018-01-01 RX ADMIN — MEROPENEM 1 G: 1 INJECTION, POWDER, FOR SOLUTION INTRAVENOUS at 10:43

## 2018-01-01 ASSESSMENT — PAIN SCALES - GENERAL
PAINLEVEL_OUTOF10: 0
PAINLEVEL_OUTOF10: 5
PAINLEVEL_OUTOF10: 0
PAINLEVEL_OUTOF10: 5
PAINLEVEL_OUTOF10: 0
PAINLEVEL_OUTOF10: 5
PAINLEVEL_OUTOF10: 0
PAINLEVEL_OUTOF10: 5
PAINLEVEL_OUTOF10: 0

## 2018-01-01 ASSESSMENT — PULMONARY FUNCTION TESTS
PIF_VALUE: 13
PIF_VALUE: 14
PIF_VALUE: 13
PIF_VALUE: 13
PIF_VALUE: 18
PIF_VALUE: 13
PIF_VALUE: 12
PIF_VALUE: 16
PIF_VALUE: 12
PIF_VALUE: 12
PIF_VALUE: 21
PIF_VALUE: 12
PIF_VALUE: 11
PIF_VALUE: 15
PIF_VALUE: 16
PIF_VALUE: 15
PIF_VALUE: 15
PIF_VALUE: 11
PIF_VALUE: 10
PIF_VALUE: 12
PIF_VALUE: 13
PIF_VALUE: 15
PIF_VALUE: 12
PIF_VALUE: 13
PIF_VALUE: 12
PIF_VALUE: 11
PIF_VALUE: 12
PIF_VALUE: 12
PIF_VALUE: 18
PIF_VALUE: 15
PIF_VALUE: 15
PIF_VALUE: 11
PIF_VALUE: 14
PIF_VALUE: 13
PIF_VALUE: 14
PIF_VALUE: 18
PIF_VALUE: 21
PIF_VALUE: 10
PIF_VALUE: 17
PIF_VALUE: 10
PIF_VALUE: 12
PIF_VALUE: 12
PIF_VALUE: 13
PIF_VALUE: 19
PIF_VALUE: 15
PIF_VALUE: 12
PIF_VALUE: 12
PIF_VALUE: 11
PIF_VALUE: 13
PIF_VALUE: 14
PIF_VALUE: 10
PIF_VALUE: 13
PIF_VALUE: 14
PIF_VALUE: 12
PIF_VALUE: 13
PIF_VALUE: 10
PIF_VALUE: 13
PIF_VALUE: 12
PIF_VALUE: 13
PIF_VALUE: 15
PIF_VALUE: 14
PIF_VALUE: 15
PIF_VALUE: 13
PIF_VALUE: 18
PIF_VALUE: 36
PIF_VALUE: 12
PIF_VALUE: 13
PIF_VALUE: 11
PIF_VALUE: 12
PIF_VALUE: 21
PIF_VALUE: 14
PIF_VALUE: 17
PIF_VALUE: 13
PIF_VALUE: 14
PIF_VALUE: 18
PIF_VALUE: 12
PIF_VALUE: 14
PIF_VALUE: 11
PIF_VALUE: 13
PIF_VALUE: 13
PIF_VALUE: 20
PIF_VALUE: 12
PIF_VALUE: 11
PIF_VALUE: 14
PIF_VALUE: 22
PIF_VALUE: 11
PIF_VALUE: 13
PIF_VALUE: 12
PIF_VALUE: 12
PIF_VALUE: 13
PIF_VALUE: 12
PIF_VALUE: 13
PIF_VALUE: 22

## 2018-10-08 PROBLEM — T50.901A DRUG OVERDOSE, ACCIDENTAL OR UNINTENTIONAL, INITIAL ENCOUNTER: Status: ACTIVE | Noted: 2018-01-01

## 2018-10-08 NOTE — H&P
Hospital Medicine History & Physical      PCP: No primary care provider on file. Date of Admission: 10/8/2018    Date of Service: Pt seen/examined on 10/8/18 and Admitted to Inpatient with expected LOS greater than two midnights due to medical therapy. Chief Complaint:  Unresponsive       History Of Present Illness:      32 y.o. male who presented to [facility] ED unresponsive with snoring respirations. Patient has no known medical history per parents at bedside. Per the mother, patient was taken to [facility] yesterday evening after falling a few days ago and injuring back. The patient was found to have pneumonia and started on Levaquin and had negative CT scans of head, neck, and back and was given prescription for Norflex. The patient has a known history of drug abuse of multiple substances, but has been sober for the past 4-5 years per mother. The parents state that they noticed the patient starting to act a little different the last few days, believed to be acting \"high\". When they would confront him he would get agitated or blow them off. Per the mother the patient was in the garage yesterday evening when he said he was not feeling well. She walked him to the cough and watched over him all night to make sure he did not stop breathing. She states at around 6:30 this morning he became unresponsive and foaming at the mouth. He was given 8 mg of Narcan by EMS with + response and then another 4 mg Narcan in ED. Patient was intubated for airway protection and snoring respirations. CT head was performed and shows \"diffuse hypoattenuation of the white matter, most significantly affecting the basal ganglia bilaterally, with mild effacement of the CSF spaces including the basal cisterns, findings concerning for hypoxic ischemic injury with diffuse edema. \" on exam patient is intubated and sedated with equal and reactive pupils 4 mm bilaterally.  He withdraws from nailbed pressure, but does not follow 10/08/18   0745  10/08/18   0844   WBC  11.0*   --    HGB  15.8  14.4   HCT  46.0   --    PLT  252   --      Recent Labs      10/08/18   0745  10/08/18   0844   NA  136   --    K  4.6   --    CL  101   --    CO2  20*   --    BUN  24*   --    CREATININE  1.79*  1.7*   CALCIUM  7.2*   --      Recent Labs      10/08/18   0745   AST  1,363*   ALT  1,475*   BILITOT  0.6   ALKPHOS  61     Recent Labs      10/08/18   0745   INR  1.2     Recent Labs      10/08/18   0745   TROPONINI  0.048*       Urinalysis:    No results found for: [place], BACTERIA, RBCUA, BLOODU, [address], GLUCOSEU    Radiology:     CXR: I have reviewed the CXR with the following interpretation: pending  EKG:  I have reviewed the EKG with the following interpretation: pending     CT Head WO Contrast   Final Result      Diffuse hypoattenuation of the white matter, most significantly affecting the basal ganglia bilaterally, with mild effacement of the CSF spaces including the basal cisterns, findings concerning for hypoxic ischemic injury with diffuse edema. Findings    discussed with Dr. Irene Coats by Dr. Bryant Jarquin at approximately 8:30 AM on 10/8/2018. All CT scans at this facility use dose modulation, iterative reconstruction, and/or weight based dosing when appropriate to reduce radiation dose to as low as reasonably achievable. XR CHEST PORTABLE    (Results Pending)       ASSESSMENT:    Active Hospital Problems    Diagnosis Date Noted    Drug overdose, accidental or unintentional, initial encounter Mya Orellana 10/08/2018       PLAN:    1. Drug overdose most likely related to heroine- admission to ICU, to remain intubated and sedated and will wean as appropriate, NPO, IVF, repeat and monitor labs as needed, ABGs as needed, telemetry  2.  R/o anoxic brain injury- CT head shows, \"diffuse hypoattenuation of the white matter, most significantly affecting the basal ganglia bilaterally, with mild effacement of the CSF spaces including the basal cisterns,

## 2018-10-08 NOTE — CONSULTS
sedated on vent   Head: normocephalic, atraumatic  Eyes:No gross abnormalities. , PERRL and Sclera nonicteric  ENT:  MMM no lesions  Neck:  supple and no masses  Chest : clear to auscultation bilaterally- no wheezes, rales or rhonchi, normal air movement, no respiratory distress  Heart[de-identified] Heart sounds are normal.  Regular rate and rhythm without murmur, gallop or rub. ABD:  symmetric, liver span normal to percussion, soft, non-tender, spleen non-palpable  Musculoskeletal : no cyanosis, no clubbing and no edema  Neuro:  sedated, on vent   Skin: No rashes or nodules noted. Lymph node:  no cervical nodes  Urology: Yes More   Psychiatric: unresponsive        Data Review  Recent Labs      10/08/18   0745  10/08/18   0844   WBC  11.0*   --    HGB  15.8  14.4   HCT  46.0   --    PLT  252   --       Recent Labs      10/08/18   0745  10/08/18   0844   NA  136   --    K  4.6   --    CL  101   --    CO2  20*   --    BUN  24*   --    CREATININE  1.79*  1.7*   GLUCOSE  130*   --        MV Settings:     Vent Mode: AC/VC  Vt Ordered: 500 mL  Rate Set: 20 bmp  PEEP/CPAP: 5  Peak Inspiratory Pressure: 22 cmH2O  Mean Airway Pressure: 9 cmH20  I:E Ratio: 1:2    ABGs:   Recent Labs      10/08/18   0844   PHART  7.384   OAI9HNS  33*   PO2ART  176*   [id number]  19.7*   BEART  -5*   [id number]  100*   [id number]  21*     O2 Device: Ventilator  Lab Results   Component Value Date    LACTA 1.7 10/08/2018       Radiology  I personally reviewed imaging studies and CXR is clear         Assessment, plan: This is a critically ill patient at risk of deterioration / death , needing close ICU monitoring and intervention due to below noted problems   · Acute encephalopathy secondary to heroin versus opiate overdose, in addition to possible other unknown substances.   · Anoxic changes on CT scan secondary to overdose and probable hypoxia/hypotension  · Acute respiratory insufficiency secondary to #1  · Acute kidney injury  · Elevated troponin, likely

## 2018-10-08 NOTE — ED NOTES
Attempt to call report without success. Told to bring pt up and do report at bedside. Respiratory therapy called for assistance.        Mendy Allen RN  10/08/18 1459

## 2018-10-08 NOTE — ED NOTES
Parents at bedside. Dr Merly Cooper spoke with parents. Pt moving hands and feet non purposefully. No posturing at this time. Pt coughing suctioned for thick white secretions. Propofol incresed to 15 mcg,kg,min. Pt pink warm and dry  Breath sounds crackles throughout.        [place], RN  10/08/18 3130

## 2018-10-08 NOTE — ED PROVIDER NOTES
58 (*)     Calcium, Ion 1.03 (*)     pCO2, Arterial 33 (*)     pO2, Arterial 176 (*)     HCO3, Arterial 19.7 (*)     Base Excess, Arterial -5 (*)     O2 Sat, Arterial 100 (*)     TCO2, Arterial 21 (*)     All other components within normal limits   CULTURE BLOOD #1   CULTURE BLOOD #2   ETHANOL   LACTIC ACID, PLASMA   MAGNESIUM   APTT         MDM:   Vitals:    Vitals:    10/08/18 0800 10/08/18 0830 10/08/18 0846 10/08/18 0911   BP: 132/89 126/87  112/78   Pulse: 110 105  99   Resp: 20 26  25   Temp:   98.2 °F (36.8 °C)    TempSrc:   Rectal    SpO2: 100% 100%  99%   Weight:           33 yo male presents to the ED with likely heroin OD. Pt given 8 mg IV narcan per EMS. Pt given another 4 mg IV narcan without any relief. Pt's initial bp 73D systolics. Pt given 1 L NS and 2nd IV started and pt given 2nd L NS. Pt unresponsive in the ED with GCS 3.  Given low GCS, likely OD unresponsive to narcan, decision was made to intubate pt. CXR shows adequate ETT placement. EKG shows sinus tach with , normal axis, normal intervals, no ST changes. Labs remarkable for glucose 130, bUN 24, Cr 1.79, WBC 11, troponin 0.048. Tox positive for opiates. CT head shows diffuse hypoattenuation fo the whiter matter, most significantly in basal ganglia concerning for hypoxia ischemia injury. Family educated about the results. Pt placed on propofol for sedation. Given respiratory failure, HERNAN, heroin OD, anoxic brain injury, case discussed with Dr. Lisa Howard and pt admitted to ICU in critical condition. CRITICAL CARE TIME   Total Critical Care time was 77 minutes, excluding separately reportable procedures. There was a high probability of clinically significant/life threatening deterioration in the patient's condition which required my urgent intervention.         PROCEDURES:  Unless otherwise noted below, none     Intubation  Date/Time: 10/8/2018 10:01 AM  Performed by: Jacob Carlin  Authorized by: Jacob Carlin Consent:     Consent obtained:  Emergent situation and verbal    Consent given by:  Parent    Risks discussed:  Aspiration, brain injury, dental trauma, laryngeal injury, pneumothorax, hypoxia, death and bleeding    Alternatives discussed:  No treatment  Pre-procedure details:     Patient status:  Unresponsive    Mallampati score:  II    Pretreatment medications:  None    Paralytics:  Rocuronium  Procedure details:     Preoxygenation:  Nonrebreather mask    CPR in progress: no      Intubation method:  Oral    Oral intubation technique:  Direct    Laryngoscope blade: Mac 4    Tube size (mm):  7.5    Tube type:  Cuffed    Number of attempts:  1    Ventilation between attempts: no      Cricoid pressure: no      Tube visualized through cords: yes    Placement assessment:     ETT to lip:  23    ETT to teeth:  22    Tube secured with:  ETT stevens    Breath sounds:  Equal    Placement verification: chest rise, condensation, CXR verification, direct visualization, equal breath sounds, esophageal detector and ETCO2 detector      CXR findings:  ETT in proper place  Post-procedure details:     Patient tolerance of procedure: Tolerated well, no immediate complications          FINAL IMPRESSION      1. Accidental overdose of heroin, initial encounter ([facility] [address]    2. Anoxic brain injury ([facility] [address]    3. Elevated troponin    4.  HERNAN (acute kidney injury) [facility])          DISPOSITION/PLAN   DISPOSITION Admitted 10/08/2018 09:32:46 AM          Yannick Cesar MD (electronically signed)  Attending Emergency Physician          Yannick Cesar MD  10/08/18 1002

## 2018-10-08 NOTE — ED NOTES
Returned from CT scAn. Pt remains unresponsive  To verbal stimuli or painful stimuli. Pupils 4 mm bilaterally and brisk to react to light. Vent settings Assist control rate of 20, Tidal Volume 500cc, Fio2 100%  Peep 5. OG to wall suctioned drained 250 cc of brown secretions. OBJECTIVE:-G auscltated in abdomen.        Kaitlin Monroy RN  10/08/18 0169

## 2018-10-09 NOTE — PROGRESS NOTES
[facility])  · Admission Body Wt: 176 lb (79.8 kg)  · Usual Body Wt:  (Not Available)  · % Weight Change:  (Not Available),   (Not Available)  · Ideal Body Wt: 178 lb (80.7 kg), % Ideal Body 107%  · BMI Classification: 25.8  · Comparative Standards (Estimated Nutrition Needs):  · Estimated Daily Total Kcal: 1895 to 2150  · Estimated Daily Protein (g): 112 to 129    Estimated Intake vs Estimated Needs: Intake Less Than Needs    Nutrition Risk Level: High    Nutrition Interventions:   Continue NPO, Start Tube Feeding  Continued Inpatient Monitoring, Education Not Indicated    Nutrition Evaluation:   · Evaluation: Goals set   · Goals: EN To Provide > 85% of Estimated Nutrient Needs. · Monitoring: Gastric Residuals, TF Tolerance, Weight, Pertinent Labs, Fluid Balance, Mental Status/Confusion    See Adult Nutrition Doc Flowsheet for more detail.      Electronically signed by Tena Leung RD, LD on 10/9/18 at 1:52 PM    Contact Number: [phone]

## 2018-10-10 NOTE — PROGRESS NOTES
congestion. No peripheral infiltrates. No pleural effusion or pneumothorax. There are mild degenerative changes in spine. MILD DEGREE OF CENTRAL VASCULAR CONGESTION. Mri Brain W Wo Contrast    Result Date: 10/9/2018  EXAMINATION: MRI BRAIN W WO CONTRAST DATE AND TIME:10/8/2018 11:00 AM CLINICAL HISTORY: HEADACHE. ANOXIC BRAIN INJURY. COMPARISON: None. TECHNIQUE:  Multi-sequence, multiplanar imaging of the brain was performed. Sequences included T1-weighted, T2-weighted, FLAIR, diffusion-weighted, ADC maps, and  susceptibility weighted imaging. VOLUME: 15 cc. MR CONTRAST: ProHance. FINDINGS: Acute change: Extensive areas of diffusion restriction involving the cerebellum, basal ganglia, anteromedial thalamic areas, bilateral florentino cerebri, cortical areas of the occipital, parietal and temporal lobes, and cortical gray matter of the frontal lobes, most striking at the precentral gyri. Findings are consistent with diffuse hypoxic ischemic injury. Small focus of susceptibility artifact noted at the left parietal occipital junction consistent with a tiny area of microbleed. No focal hematoma. Ventricles: Ventricles are midline in position and may be slightly diminished in volume suggesting diffuse brain swelling. Skull base: Cerebellar tonsils are normally positioned. No evidence of a marrow replacement process. Vasculature: The major intracranial arterial structures and dural venous sinuses showed typical flow void, suggesting patency by spin-echo criteria. Sinuses: Minimal mucosal changes in the maxillary and ethmoid air cells. FINDINGS CONSISTENT WITH HYPOXIC ISCHEMIC ENCEPHALOPATHY. This SmartLink has not been configured with any valid records.     cbc    Recent Labs      10/08/18   0745  10/08/18   0844  10/09/18   0457   WBC  11.0*   --   16.4*   HGB  15.8  14.4  15.7   PLT  252   --   249     Recent Labs      10/08/18   0745  10/08/18   0844

## 2018-10-10 NOTE — PROGRESS NOTES
Lexus Burnette, APRN - CNP        albuterol sulfate  (90 Base) MCG/ACT inhaler 4 puff  4 puff Inhalation 4x daily Ruth Garcia MD   4 puff at 10/10/18 0851    ipratropium (ATROVENT HFA) 17 MCG/ACT inhaler 4 puff  4 puff Inhalation 4x daily Ruth Garcia MD   4 puff at 10/10/18 0851    famotidine (PEPCID) tablet 20 mg  20 mg Oral BID Ruth Garcia MD   20 mg at 10/10/18 1019    glucose (GLUTOSE) 40 % oral gel 15 g  15 g Oral PRN Ruth Garcia MD        dextrose 50 % solution 12.5 g  12.5 g Intravenous PRN Ruth Garcia MD        glucagon (rDNA) injection 1 mg  1 mg Intramuscular PRN Ruth Garcia MD        dextrose 5 % solution  100 mL/hr Intravenous PRN Ruth Garcia MD        insulin lispro (HUMALOG) injection vial 0-6 Units  0-6 Units Subcutaneous Q4H Ruth Garcia MD   Stopped at 10/10/18 0053    sodium chloride flush 0.9 % injection 10 mL  10 mL Intravenous BID Agata Chinchilla MD   10 mL at 10/10/18 1021     Allergies   Allergen Reactions    Penicillins Rash     Active Problems:    Drug overdose, accidental or unintentional, initial encounter  Resolved Problems:    * No resolved hospital problems. *    Blood pressure (!) 182/84, pulse 87, temperature 99.8 °F (37.7 °C), temperature source Oral, resp. rate 29, height 6' (1.829 m), weight 190 lb 0.6 oz (86.2 kg), SpO2 96 %. Subjective  Objective:  General Appearance:  Ill-appearing. Vital signs: (most recent): Blood pressure (!) 151/69, pulse 76, temperature 98.8 °F (37.1 °C), temperature source Oral, resp. rate 20, weight 188 lb 11.4 oz (85.6 kg), SpO2 97 %. HEENT: (ET tube)    Lungs:  (Scattered rhonchi, decrease BS)  Heart: S1 normal and S2 normal.  No murmur. Abdomen: Abdomen is soft. Bowel sounds are normal.     Pulses: Distal pulses are intact. Neurological: Patient is alert and oriented to person, place and time. Pupils:  Pupils are equal, round, and reactive to light. Skin:  Dry.       Lab

## 2018-10-11 NOTE — PROGRESS NOTES
Neena Nageotte is a 32 y.o. male patient.  Pt was seen and evaluated, pt is off sedation, no response to noxious stimuli in coma  Review of Systems   Unable to perform ROS: Intubated     Current Facility-Administered Medications   Medication Dose Route Frequency Provider Last Rate Last Dose    furosemide (LASIX) tablet 20 mg  20 mg Per NG tube Daily Krystyna King MD   20 mg at 10/11/18 0830    labetalol (NORMODYNE;TRANDATE) injection 10 mg  10 mg Intravenous Q6H PRN Krystyna King MD        hydrALAZINE (APRESOLINE) injection 10 mg  10 mg Intravenous Q6H PRN Krystyna King MD   10 mg at 10/10/18 2105    meropenem (MERREM) 1 g in sodium chloride 0.9 % 100 mL IVPB (mini-bag)  1 g Intravenous 3 times per day Krystyna King MD   Stopped at 10/11/18 0415    docusate (COLACE) 50 MG/5ML liquid 100 mg  100 mg Oral BID Krystyna King MD   100 mg at 10/11/18 0830    hypromellose (ISOPTO TEARS) 0.5 % ophthalmic solution 1 drop  1 drop Both Eyes Q4H PRN Krystyna King MD   1 drop at 10/10/18 2005    acetaminophen (TYLENOL) 160 MG/5ML solution 650 mg  650 mg Oral Q4H PRN Fercho Edwards MD   650 mg at 10/11/18 0821    sodium chloride flush 0.9 % injection 10 mL  10 mL Intravenous 2 times per day Nolon Kristin, APRN - CNP   10 mL at 10/11/18 1693    sodium chloride flush 0.9 % injection 10 mL  10 mL Intravenous PRN Nolon Kristin APRN - CNP        magnesium hydroxide (MILK OF MAGNESIA) 400 MG/5ML suspension 30 mL  30 mL Oral Daily PRN Nolon Kristin, APRN - CNP        ondansetron (ZOFRAN) injection 4 mg  4 mg Intravenous Q6H PRN Nolon Kristin, APRN - CNP        enoxaparin (LOVENOX) injection 40 mg  40 mg Subcutaneous Daily Nolon Kristin, APRN - CNP   40 mg at 10/11/18 0831    potassium chloride 10 mEq/100 mL IVPB (Peripheral Line)  10 mEq Intravenous PRN Nolon Kristin APRN - CNP        magnesium sulfate 1 g in dextrose 5% 100 mL IVPB  1 g Intravenous PRN Jose Antonio Foster, APRN - [place]

## 2018-10-12 NOTE — PROGRESS NOTES
Neurology Follow up    SUBJECTIVE:  Coma , no seizures  PHYSICAL EXAM:    General Appearance:    BP (!) 156/65   Pulse 74   Temp 99 °F (37.2 °C) (Oral)   Resp 26   Ht 6' (1.829 m)   Wt 190 lb 0.6 oz (86.2 kg)   SpO2 95%   BMI 25.77 kg/m²   Mental Status Exam:             Unresponsive, minmal sedation  Funduscopic Exam:     Cranial Nerves            Cranial nerve III           Pupils:  equal, round, nonreactive to light 2 mm      Cranial nerves III, IV, VI, dolls absent           Extraocular Movements: intact  Occasional  myoclonus        Motor: no response   Drift:  absent  Motor exam is symmetrical Tone:  normal  Abnormal Movements:  absent            Sensory:none        Pinprick             Right Upper Extremity:  normal             Left Upper Extremity:  normal             Right Lower Extremity:  normal             Left Lower Extremity:  normal           Vibration                         Touch            Proprioception                 Coordination: unable to do          Finger/Nose   Right:  normal              Left:  normal          Heel-Knee-Shin                Right:  normal              Left:  normal          Rapid Alternating Movements              Right:  normal              Left:  normal          Gait:                       Casual: none         Reflexes:             Deep Tendon Reflexes:             Reflexes are 2 +             Plantar response:                Right:  downgoing               Left:  downgoing    Vascular:  Cardiac Exam:  normal         Ct Head Wo Contrast    Result Date: 10/8/2018  EXAMINATION: CT of the brain without contrast HISTORY: Fall. Unresponsive. COMPARISON: None available TECHNIQUE: Multiple contiguous axial images were obtained of the brain from the skull base through the vertex. Multiplanar reformats were obtained. FINDINGS: Brain volume is age appropriate. Ventricular morphology is within normal limits.  There is diffuse hypoattenuation of the white matter, most --   142   K  4.6   --   4.0  4.0   CL  101   --   106   CO2  20*   --   22   BUN  24*   --   13   CREATININE  1.79*  1.7*  0.70   GLUCOSE  130*   --   114*     Recent Labs      10/08/18   0745  10/09/18   0457  10/09/18   1017   BILITOT  0.6  0.8  0.9   ALKPHOS  61  65  62   AST  1,363*  1,078*  788*   ALT  1,475*  1,919*  1,775*     Lab Results   Component Value Date    PROTIME 13.4 10/09/2018    INR 1.3 10/09/2018     No results found for: LITHIUM, DILFRTOT, VALPROATE    ASSESSMENT AND PLAN    COMA  No change  eeg very slow, no seizures,  Has some brainstem responses, thought all reflex,   CT repeat, increased edema, brainstem , cerebellar,bilateral thalmao, subcortical  Hypoxic ischemic encephalopathy  No response'  MRI shows patchy edema, posterior and bithalamic  Poor prognosis  Off sedation  Will add eeg  Plan wait till Friday and if no change consider weaning  Discussed with mother and ICU staff, showed scans  Pt is terminal, will be vegetative, and will not regain function  Eeg, very slow,no seizures    Glenroy Briceno MD, Meagan Dee, American Board of Psychiatry & Neurology  Board Certified in Vascular Neurology  Board Certified in Neuromuscular Medicine  Certified in [address]

## 2018-10-12 NOTE — PROGRESS NOTES
Neurology Follow up    SUBJECTIVE:  Coma , no seizures  PHYSICAL EXAM:    General Appearance:    BP (!) 162/77   Pulse 80   Temp 99.9 °F (37.7 °C) (Oral)   Resp (!) 33   Ht 6' (1.829 m)   Wt 190 lb 0.6 oz (86.2 kg)   SpO2 96%   BMI 25.77 kg/m²       Mental Status Exam:             Unresponsive, minmal sedation  Funduscopic Exam:     Cranial Nerves            Cranial nerve III           Pupils:  equal, round, nonreactive to light 2 mm      Cranial nerves III, IV, VI, dolls absent           Extraocular Movements: intact        Motor: no response   Drift:  absent  Motor exam is symmetrical 5 out of 5 all extremities bilaterally  Tone:  normal  Abnormal Movements:  absent            Sensory:none        Pinprick             Right Upper Extremity:  normal             Left Upper Extremity:  normal             Right Lower Extremity:  normal             Left Lower Extremity:  normal           Vibration                         Touch            Proprioception                 Coordination: unable to do          Finger/Nose   Right:  normal              Left:  normal          Heel-Knee-Shin                Right:  normal              Left:  normal          Rapid Alternating Movements              Right:  normal              Left:  normal          Gait:                       Casual: none         Reflexes:             Deep Tendon Reflexes:             Reflexes are 2 +             Plantar response:                Right:  downgoing               Left:  downgoing    Vascular:  Cardiac Exam:  normal         Ct Head Wo Contrast    Result Date: 10/8/2018  EXAMINATION: CT of the brain without contrast HISTORY: Fall. Unresponsive. COMPARISON: None available TECHNIQUE: Multiple contiguous axial images were obtained of the brain from the skull base through the vertex. Multiplanar reformats were obtained. FINDINGS: Brain volume is age appropriate. Ventricular morphology is within normal limits.  There is diffuse hypoattenuation of the

## 2018-10-12 NOTE — CARE COORDINATION
PATIENT EXTUBATED, ORDER FOR HOSPICE. SPOKE WITH BIANCA AT [facility]. THEY WILL COME AND MEET WITH HIS PARENTS BETWEEN 3:30-4. FAMILY AWARE.   Electronically signed by Diana Goodson RN on 10/12/18 at 3:01 PM

## 2018-10-14 LAB
CALCIUM IONIZED, CALC AT PH 7.4: 1.24 MMOL/L (ref 1.11–1.3)
CALCIUM IONIZED: 1.21 MMOL/L (ref 1.11–1.3)